# Patient Record
(demographics unavailable — no encounter records)

---

## 2017-10-15 NOTE — ED PHYSICIAN DOCUMENTATION
General Adult





- HISTORIAN


Historian: patient, paramedics





- [facility]


Chief Complaint: General Adult


Additional Information: 





awoke 0130 dizzy anxious emesis. had  went to concert at [employer]-enjoyed - went 

to bed approx 2100 had slept well until awoke.    eyes swuinted tight emesis 

bassin at chin.  we took that away asked her to open eyes-when she did she 

smiled and said  "there you are" started conversing normally


Onset: hours


Timing: still present, worse ( then better when engaged in conversation)


Severity: mild





- ROS


CONST: no problems.  denies: fever, sweating, recent illness, weakness, weight 

loss


EYES/ENT: none


CVS/RESP: none


GI/: none


MS/SKIN/LYMPH: none


NEURO/PSYCH: dizziness.  denies: headache, fainting





- PAST HX


Past History: other (anxiety     gerd    hi chol    htn)


Surgeries/Procedures: hysterectomy, other (appy    rt benign breast biopsy)


Allergies/Adverse Reactions: 


 Allergies











Allergy/AdvReac Type Severity Reaction Status Date / Time


 


No Known Allergies Allergy   Verified 10/15/17 02:33














Home Medications: 


 Ambulatory Orders











 Medication  Instructions  Recorded


 


Loratadine [Claritin] 10 mg PO DAILY 06/23/17


 


Beclomethasone Dipropionate [Qvar] 2 puff IH BID 10/15/17














- SOCIAL HX


Smoking History: non-smoker


Alcohol Use: none


Drug Use: none





- FAMILY HX


Family History: No





- VITAL SIGNS


Vital Signs: 





 Vital Signs











Temp Pulse Resp BP Pulse Ox


 


          125/69    


 


          06/23/17 18:19   














- REVIEWED ASSESSMENTS


Nursing Assessment  Reviewed: Yes


Vitals Reviewed: Yes





ED Results Lab/Radiology





- Orders


Orders: 





 ED Orders











 Category Date Time Status


 


 Place IV Lock 1T Care  10/15/17 02:32 Ordered


 


 AMYLASE Routine Lab  10/15/17 Ordered


 


 CBC/PLATELET/DIFF Routine Lab  10/15/17 Ordered


 


 CMP Routine Lab  10/15/17 Ordered


 


 URINALYSIS Routine Lab  10/15/17 Ordered


 


 LORazepam [Ativan] Med  10/15/17 02:33 Once





 1 mg IVP NOW ONE   


 


 LORazepam [Ativan] Med  10/15/17 02:28 Discontinued





 4 mg .ROUTE .STK-MED ONE   


 


 NORMAL SALINE @ 1000 MLS/HR ( 1000ml BOLUS) Med  10/15/17 02:32 Ordered





 0.9 % Sodium Chloride [Normal Saline] 1,000 ml   





 IV Q1H   


 


 Ondansetron HCl/Pf [Zofran 4 mg/2 ml] Med  10/15/17 02:26 Discontinued





 4 mg .ROUTE .STK-MED ONE   


 


 Ondansetron HCl/Pf [Zofran 4 mg/2 ml] Med  10/15/17 02:33 Once





 4 mg IVP NOW ONE   


 


 EKG WITH COMPARISON Stat Ther  10/15/17 Ordered














General Adult Physical Exam





- PHYSICAL EXAM


GENERAL APPEARANCE: mild distress


EENT: eye inspection normal


NECK: normal inspection, thyroid normal


RESPIRATORY: no resp distress, breath sounds normal


CVS: reg rate & rhythm, heart sounds normal


ABDOMEN: soft, non-tender


SKIN: warm/dry, normal color.  No: cyanosis, diaphoresis, jaundice


EXTREMITIES: non-tender, normal range of motion


NEURO: oriented X3, motor nml, sensation nml, mood/affect nml





Discharge


Clincal Impression: 


 anxiety reaction, hypokalemia





Referrals: 


Tash Goff MD [Primary Care Provider] - 2 Days


Comments: 





home w/ daughter get po  potassium tomorrow-script attached.  f/u w/pcp or rt 

ed prn.   pt now alert cheerful denies c/o ask for light to be turned out so 

she would rest.  allow iv to infuse before sending home.  daughter is on the way


Condition: Good


Disposition: 01 HOME, SELF-CARE


Decision to Admit: NO


Decision Time: 03:23

## 2018-04-05 NOTE — DIAGNOSTIC IMAGING REPORT
BEBA MUSA 

[facility]

[address] [facility].[address] [address]

 

 

 

 

Report Submission Date: 2018 9:47:52 AM CDT

Patient       Study

Name:   ROMA GILLIAM       Date:   2018 9:01:25 AM CDT

MRN:   [id number]       Modality Type:   DX

Gender:   F       Description:   CHEST

:   6/15/32       Institution:   [facility]

Physician:   BEBA MUSA

     Accession:    [id number]

 

 

Examination: PA and lateral chest. 



History: Evaluate lung fields. CXR, COUGH FOR ABOUT A WEEK, BRONCHITIS (Hx) 



Comparison exam: None provided. 



Findings: PA lateral chest demonstrate a normal cardiac and mediastinal 
silhouette. Mild tortuosity of thoracic aorta. Chronic interstitial changes. No 
focal infiltrate.  No blunting of the costophrenic margins. Articular 
degenerative changes. Thoracolumbar curvature to the left. 



Impression: Chronic interstitial changes. No acute pulmonary process.

 

Electronically signed on 2018 9:47:52 AM CDT by:

Marc CRUZ

## 2018-08-04 NOTE — DIAGNOSTIC IMAGING REPORT
NITHIN MON 

[facility]

[address] [facility] [address] [address]

 

 

 

 

Report Submission Date: Aug 4, 2018 4:10:58 PM CDT

Patient       Study

Name:   ROMA GILLIAM       Date:   Aug 4, 2018 3:36:31 PM CDT

MRN:   [id number]       Modality Type:   CT\SR

Gender:   F       Description:   CT C-SPINE W/O CONTRAS

:   6/15/32       Institution:   [facility]

Physician:   NITHIN MON

     Accession:    [id number]

 

 

CT cervical spine. 



CLINICAL HISTORY:  FALL WITH INJURY (Hx) / ITS.REASON fell onto parking lot 

-------------------------------------------------- 





TECHNIQUE: 

3 mm contiguous axial images of the cervical spine with sagittal and coronal 
reconstructions.    



FINDINGS: 

The cervical spine alignment is normal. The cervical vertebral bodies are of 
normal height.  The cervical vertebral bodies and posterior elements are 
intact. The spinal canal diameter is normal. There is no evidence of acute 
fracture or subluxation. The facets are in proper relationship bilaterally. The 
craniocervical and cervicothoracic junctions are normal. There is disc 
degeneration at C5-C6 and C6-C7 with mild bilateral neural foraminal stenosis. 



IMPRESSION: 

No evidence of acute cervical spine fracture or subluxation. 



Cervical spondylosis.

 

Electronically signed on Aug 4, 2018 4:10:58 PM CDT by:

Prakash CRUZ

## 2018-08-04 NOTE — DIAGNOSTIC IMAGING REPORT
NITHIN MON 

[facility]

[address] [facility] [address] [place] [address]. [address]

 

 

 

 

Report Submission Date: Aug 4, 2018 4:08:46 PM CDT

Patient       Study

Name:   ROMA GILLIAM       Date:   Aug 4, 2018 3:32:03 PM CDT

MRN:   [id number]       Modality Type:   CT\SR

Gender:   F       Description:   CT MAXILLOFACIAL W/O D

:   6/15/32       Institution:   [facility]

Physician:   NITHIN MON

     Accession:    [id number]

 

 

CT facial bones without contrast. 



History: fall pain. 



Technique: Transaxial computed tomography images of facial bones were obtained 
without the use of intravenous contrast according to standard protocol. 



FINDINGS:   



The calvarium is intact.  There is no evidence of orbital fracture.  There is a 
fracture of the inferior and posterolateral wall of the right maxillary sinus 
present with diffuse mucosal thickening in the right maxillary sinus.  There is 
no evidence of acute nasal bone fracture.  The bilateral zygomatic arches are 
intact.  The mandible maxilla are intact.   



The globes are intact.  No intraconal extraconal stranding identified. 



IMPRESSION:   

1. Nondisplaced fracture of the inferior posterolateral wall of the right 
maxillary sinus, likely old.   



2. No additional facial fracture identified.

 

Electronically signed on Aug 4, 2018 4:08:46 PM CDT by:

Ramos CRUZ

## 2018-08-04 NOTE — ED PHYSICIAN DOCUMENTATION
Fall





- HISTORIAN


Historian: patient, child (daughter)





- HPI


Stated Complaint: laceration


Chief Complaint: Fall


Additional Information: 





Tripped over parking block and landed with left hand and chin making contact 

most forcefully with parking lot.  Has lac to chin denies numbness, tingling, 

pain. Also landed on knees but says they feel ok; notes bruise to left. 

Occurred just prior to arrival. No modifying factos or associated signs. 

Accompanied by daughter. Lives at [facility]. 





- ROS


CONST: no problems





- PAST HX


Past History: other (hysterectomy, appendectomy)


Allergies/Adverse Reactions: 


 Allergies











Allergy/AdvReac Type Severity Reaction Status Date / Time


 


No Known Allergies Allergy   Verified 18 15:33














Home Medications: 


 Ambulatory Orders











 Medication  Instructions  Recorded


 


Loratadine [Claritin] 10 mg PO DAILY PRN 17














- SOCIAL HX


Smoking History: non-smoker





- FAMILY HX


Family History: no significant history





- VITAL SIGNS


Vital Signs: 





 Vital Signs











Temp Pulse Resp BP Pulse Ox


 


 98.2 F   88   16   149/71   96 


 


 18 14:44  18 14:44  18 14:44  18 14:44  18 14:44














- REVIEWED ASSESSMENTS


Nursing Assessment  Reviewed: Yes


Vitals Reviewed: Yes





Procedures


Wound Location: head (chin)


Wound Length: 2 cm


Wound's Depth, Shape: other (v shaped)


Wound Explored: no foreign body removed


Betadine Prep?: No (hexachloridine)


Anesthesia: 0.5% Sensorcaine


Volume of Anesthetic: 1


Wound Repaired With: sutures


Suture Size/Type: 5:0


Number of Sutures: 1


Layer Closure?: No


Sterile Dressing Applied?: Yes (band aid)





Progress





- Progress


Progress: 








 


Report Submission Date: Aug 4, 2018 4:10:58 PM CDT


Patient       Study


Name:   ROMA GILLIAM       Date:   Aug 4, 2018 3:36:31 PM CDT


MRN:   [id number]       Modality Type:   CT\SR


Gender:   F       Description:   CT C-SPINE W/O CONTRAS


:   6/15/32       Institution:   [facility]


Physician:   NITHIN MON - KEVAN


     Accession:    [id number]


 


 


CT cervical spine. 





CLINICAL HISTORY:  FALL WITH INJURY (Hx) / ITS.REASON fell onto parking lot 


-------------------------------------------------- 








TECHNIQUE: 


3 mm contiguous axial images of the cervical spine with sagittal and coronal 

reconstructions.    





FINDINGS: 


The cervical spine alignment is normal. The cervical vertebral bodies are of 

normal height.  The cervical vertebral bodies and posterior elements are 

intact. The spinal canal diameter is normal. There is no evidence of acute 

fracture or subluxation. The facets are in proper relationship bilaterally. The 

craniocervical and cervicothoracic junctions are normal. There is disc 

degeneration at C5-C6 and C6-C7 with mild bilateral neural foraminal stenosis. 





IMPRESSION: 


No evidence of acute cervical spine fracture or subluxation. 





Cervical spondylosis.


 


Electronically signed on Aug 4, 2018 4:10:58 PM CDT by:


Prakash Romero








 


Report Submission Date: Aug 4, 2018 4:08:46 PM CDT


Patient       Study


Name:   ROMA GILLIAM       Date:   Aug 4, 2018 3:32:03 PM CDT


MRN:   [id number]       Modality Type:   CT\SR


Gender:   F       Description:   CT MAXILLOFACIAL W/O D


:   6/15/32       Institution:   [facility]


Physician:   NITHIN MON


     Accession:    [id number]


 


 


CT facial bones without contrast. 





History: fall pain. 





Technique: Transaxial computed tomography images of facial bones were obtained 

without the use of intravenous contrast according to standard protocol. 





FINDINGS:   





The calvarium is intact.  There is no evidence of orbital fracture.  There is a 

fracture of the inferior and posterolateral wall of the right maxillary sinus 

present with diffuse mucosal thickening in the right maxillary sinus.  There is 

no evidence of acute nasal bone fracture.  The bilateral zygomatic arches are 

intact.  The mandible maxilla are intact.   





The globes are intact.  No intraconal extraconal stranding identified. 





IMPRESSION:   


1. Nondisplaced fracture of the inferior posterolateral wall of the right 

maxillary sinus, likely old.   





2. No additional facial fracture identified.


 


Electronically signed on Aug 4, 2018 4:08:46 PM CDT by:


Ramos Diez





ED Results Lab/Radiology





- Orders


Orders: 





 ED Orders











 Category Date Time Status


 


 CT C-SPINE W/O CONTRAST Stat Exams  18 Ordered


 


 CT MAXILLOFACIAL W/O DYE Stat Exams  18 Ordered


 


 Bupivacaine HCl/Pf [Marcaine 0.5%] Med  18 15:08 Discontinued





 50 mg IJ NOW ONE   


 


 Diph,Pertuss(Acell),Tet Vac/Pf [Adacel] Med  18 14:37 Discontinued





 0.5 ml IM .STK-MED ONE   














Fall Physical Exam





- Physical Exam


General Appearance: alert, mild distress


Head: non-tender, no swelling, trauma (V shaped 1+ cm lac, sub q, minimal oozing

)


Neck: non-tender, painless ROM


Eye: lids & conjunct. nml (conjugate movements)


ENT: nml external inspection, no dental injury, no oral injury, airway nml ([place])


Resp/CVS: no resp. distress


Abdomen: soft


Neuro: CN's nml as tested, sensation nml, motor nml


Skin: color nml, dry


Back: normal inspection


Extremities: atraumatic, hips non-tender, other (purple ecchymosis left 

patellar area with pain free ROM, quite superficial abrasion. R knee with pain 

free ROM, quite superficial abrasion. No ecchymosis. )


Joint: Nml gait/weight bearing





- [place] Coma Score


Eyes Open: Spontaneous


Speech: Oriented


Motor: Obeys Commands





Discharge


Clincal Impression: 


Laceration of chin


Qualifiers:


 Encounter type: initial encounter Qualified Code(s): S01.81XA - Laceration 

without foreign body of other part of head, initial encounter





Referrals: 


Tash Goff MD [Primary Care Provider] - 2 Days


Condition: Good


Disposition: 01 HOME, SELF-CARE


Decision to Admit: NO


Decision Time: 16:45